# Patient Record
Sex: MALE | Race: WHITE | NOT HISPANIC OR LATINO | URBAN - METROPOLITAN AREA
[De-identification: names, ages, dates, MRNs, and addresses within clinical notes are randomized per-mention and may not be internally consistent; named-entity substitution may affect disease eponyms.]

---

## 2018-10-08 ENCOUNTER — EMERGENCY (EMERGENCY)
Facility: HOSPITAL | Age: 48
LOS: 1 days | Discharge: HOME | End: 2018-10-08
Attending: EMERGENCY MEDICINE
Payer: COMMERCIAL

## 2018-10-08 VITALS
RESPIRATION RATE: 18 BRPM | SYSTOLIC BLOOD PRESSURE: 166 MMHG | HEART RATE: 94 BPM | TEMPERATURE: 99 F | DIASTOLIC BLOOD PRESSURE: 60 MMHG | WEIGHT: 220.02 LBS | OXYGEN SATURATION: 96 % | HEIGHT: 71 IN

## 2018-10-08 PROCEDURE — 99284 EMERGENCY DEPT VISIT MOD MDM: CPT | Mod: 25

## 2018-10-08 RX ORDER — MORPHINE SULFATE 50 MG/1
4 CAPSULE, EXTENDED RELEASE ORAL ONCE
Qty: 0 | Refills: 0 | Status: DISCONTINUED | OUTPATIENT
Start: 2018-10-08 | End: 2018-10-08

## 2018-10-08 RX ORDER — ALBUTEROL 90 UG/1
2.5 AEROSOL, METERED ORAL ONCE
Qty: 0 | Refills: 0 | Status: COMPLETED | OUTPATIENT
Start: 2018-10-08 | End: 2018-10-08

## 2018-10-08 RX ADMIN — ALBUTEROL 2.5 MILLIGRAM(S): 90 AEROSOL, METERED ORAL at 23:54

## 2018-10-08 NOTE — ED PROVIDER NOTE - MEDICAL DECISION MAKING DETAILS
47 y/o M pt woth HTN and sarcoidosis presents with sudden onset of shortness of breath and chest pain. Will do EKG, labs and CXR. Given albuterol nebs and reassess.

## 2018-10-08 NOTE — ED PROVIDER NOTE - PROGRESS NOTE DETAILS
labs show neg trop, ddimer wnl, CXR shows no focal infiltrate buts shows increased interstitial markings labs show neg trop, ddimer wnl, CXR shows no focal infiltrate buts shows increased interstitial markings  on reeval pt requesting discharge but agreed to CTA, CTA neg for PE but shows 5cm mediastinal mass, discussed above results with patient, pt reports he has a pulmonologist and will f/u with him.   On reeval patient shows no evidence of resp distress/hypoxia. Pt stable for discharge.

## 2018-10-08 NOTE — ED PROVIDER NOTE - OBJECTIVE STATEMENT
47 y/o M pt with a PMHx of HTN, sarcoidosis presents to the ED with complaints of sudden onset shortness of breath and chest pain. Patient reports he was walking his dog when he suddenly felt shortness of breath and used his family member's inhaler with minimal relief. Patient states he has a pressure on the right side of his chest. Patient notes his sarcoidosis has not given him any issues for the last 13 years and perviously managed it with steroids. Patient reports last week he had a cold with cough but has then resolved. Patient notes for the last couple days he has left lower leg pain. Patient denies recent trauma, FHx or personal hx of blood clots. NKDA.

## 2018-10-08 NOTE — ED PROVIDER NOTE - CONDUCTED A DETAILED DISCUSSION WITH PATIENT AND/OR GUARDIAN REGARDING, MDM
lab results/radiology results return to ED if symptoms worsen, persist or questions arise/radiology results/need for outpatient follow-up/lab results

## 2018-10-09 VITALS
RESPIRATION RATE: 18 BRPM | DIASTOLIC BLOOD PRESSURE: 67 MMHG | SYSTOLIC BLOOD PRESSURE: 114 MMHG | TEMPERATURE: 98 F | OXYGEN SATURATION: 97 % | HEART RATE: 98 BPM

## 2018-10-09 LAB
ALBUMIN SERPL ELPH-MCNC: 3.8 G/DL — SIGNIFICANT CHANGE UP (ref 3.5–5)
ALP SERPL-CCNC: 92 U/L — SIGNIFICANT CHANGE UP (ref 40–120)
ALT FLD-CCNC: 40 U/L DA — SIGNIFICANT CHANGE UP (ref 10–60)
ANION GAP SERPL CALC-SCNC: 13 MMOL/L — SIGNIFICANT CHANGE UP (ref 5–17)
APTT BLD: 30.8 SEC — SIGNIFICANT CHANGE UP (ref 27.5–37.4)
AST SERPL-CCNC: 17 U/L — SIGNIFICANT CHANGE UP (ref 10–40)
BILIRUB SERPL-MCNC: 0.2 MG/DL — SIGNIFICANT CHANGE UP (ref 0.2–1.2)
BUN SERPL-MCNC: 21 MG/DL — HIGH (ref 7–18)
CALCIUM SERPL-MCNC: 9 MG/DL — SIGNIFICANT CHANGE UP (ref 8.4–10.5)
CHLORIDE SERPL-SCNC: 106 MMOL/L — SIGNIFICANT CHANGE UP (ref 96–108)
CO2 SERPL-SCNC: 23 MMOL/L — SIGNIFICANT CHANGE UP (ref 22–31)
CREAT SERPL-MCNC: 0.92 MG/DL — SIGNIFICANT CHANGE UP (ref 0.5–1.3)
D DIMER BLD IA.RAPID-MCNC: <150 NG/ML DDU — SIGNIFICANT CHANGE UP
GLUCOSE SERPL-MCNC: 127 MG/DL — HIGH (ref 70–99)
HCT VFR BLD CALC: 41.2 % — SIGNIFICANT CHANGE UP (ref 39–50)
HGB BLD-MCNC: 13.8 G/DL — SIGNIFICANT CHANGE UP (ref 13–17)
INR BLD: 1.08 RATIO — SIGNIFICANT CHANGE UP (ref 0.88–1.16)
MCHC RBC-ENTMCNC: 28.8 PG — SIGNIFICANT CHANGE UP (ref 27–34)
MCHC RBC-ENTMCNC: 33.5 GM/DL — SIGNIFICANT CHANGE UP (ref 32–36)
MCV RBC AUTO: 86 FL — SIGNIFICANT CHANGE UP (ref 80–100)
PLATELET # BLD AUTO: 191 K/UL — SIGNIFICANT CHANGE UP (ref 150–400)
POTASSIUM SERPL-MCNC: 4 MMOL/L — SIGNIFICANT CHANGE UP (ref 3.5–5.3)
POTASSIUM SERPL-SCNC: 4 MMOL/L — SIGNIFICANT CHANGE UP (ref 3.5–5.3)
PROT SERPL-MCNC: 6.7 G/DL — SIGNIFICANT CHANGE UP (ref 6–8.3)
PROTHROM AB SERPL-ACNC: 11.8 SEC — SIGNIFICANT CHANGE UP (ref 9.8–12.7)
RBC # BLD: 4.79 M/UL — SIGNIFICANT CHANGE UP (ref 4.2–5.8)
RBC # FLD: 12 % — SIGNIFICANT CHANGE UP (ref 10.3–14.5)
SODIUM SERPL-SCNC: 142 MMOL/L — SIGNIFICANT CHANGE UP (ref 135–145)
TROPONIN I SERPL-MCNC: <0.015 NG/ML — SIGNIFICANT CHANGE UP (ref 0–0.04)
WBC # BLD: 5.5 K/UL — SIGNIFICANT CHANGE UP (ref 3.8–10.5)
WBC # FLD AUTO: 5.5 K/UL — SIGNIFICANT CHANGE UP (ref 3.8–10.5)

## 2018-10-09 PROCEDURE — 83880 ASSAY OF NATRIURETIC PEPTIDE: CPT

## 2018-10-09 PROCEDURE — 71045 X-RAY EXAM CHEST 1 VIEW: CPT | Mod: 26

## 2018-10-09 PROCEDURE — 71275 CT ANGIOGRAPHY CHEST: CPT

## 2018-10-09 PROCEDURE — 85730 THROMBOPLASTIN TIME PARTIAL: CPT

## 2018-10-09 PROCEDURE — 71275 CT ANGIOGRAPHY CHEST: CPT | Mod: 26

## 2018-10-09 PROCEDURE — 84484 ASSAY OF TROPONIN QUANT: CPT

## 2018-10-09 PROCEDURE — 71045 X-RAY EXAM CHEST 1 VIEW: CPT

## 2018-10-09 PROCEDURE — 85379 FIBRIN DEGRADATION QUANT: CPT

## 2018-10-09 PROCEDURE — 94640 AIRWAY INHALATION TREATMENT: CPT

## 2018-10-09 PROCEDURE — 85027 COMPLETE CBC AUTOMATED: CPT

## 2018-10-09 PROCEDURE — 85610 PROTHROMBIN TIME: CPT

## 2018-10-09 PROCEDURE — 99284 EMERGENCY DEPT VISIT MOD MDM: CPT | Mod: 25

## 2018-10-09 PROCEDURE — 93005 ELECTROCARDIOGRAM TRACING: CPT

## 2018-10-09 PROCEDURE — 80053 COMPREHEN METABOLIC PANEL: CPT

## 2018-10-09 NOTE — ED ADULT NURSE NOTE - NSIMPLEMENTINTERV_GEN_ALL_ED
Implemented All Universal Safety Interventions:  [place] to call system. Call bell, personal items and telephone within reach. Instruct patient to call for assistance. Room bathroom lighting operational. Non-slip footwear when patient is off stretcher. Physically safe environment: no spills, clutter or unnecessary equipment. Stretcher in lowest position, wheels locked, appropriate side rails in place.

## 2021-06-29 ENCOUNTER — INPATIENT (INPATIENT)
Facility: HOSPITAL | Age: 51
LOS: 0 days | Discharge: LEFT BEFORE DISCHARGE | DRG: 343 | End: 2021-06-29
Attending: SURGERY | Admitting: SURGERY
Payer: COMMERCIAL

## 2021-06-29 VITALS
RESPIRATION RATE: 16 BRPM | SYSTOLIC BLOOD PRESSURE: 112 MMHG | DIASTOLIC BLOOD PRESSURE: 65 MMHG | HEART RATE: 67 BPM | OXYGEN SATURATION: 98 % | TEMPERATURE: 98 F

## 2021-06-29 VITALS
HEART RATE: 62 BPM | WEIGHT: 210.1 LBS | TEMPERATURE: 98 F | RESPIRATION RATE: 17 BRPM | HEIGHT: 71 IN | SYSTOLIC BLOOD PRESSURE: 157 MMHG | OXYGEN SATURATION: 99 % | DIASTOLIC BLOOD PRESSURE: 96 MMHG

## 2021-06-29 DIAGNOSIS — K35.80 UNSPECIFIED ACUTE APPENDICITIS: ICD-10-CM

## 2021-06-29 LAB
ALBUMIN SERPL ELPH-MCNC: 4.4 G/DL — SIGNIFICANT CHANGE UP (ref 3.5–5)
ALP SERPL-CCNC: 73 U/L — SIGNIFICANT CHANGE UP (ref 40–120)
ALT FLD-CCNC: 30 U/L DA — SIGNIFICANT CHANGE UP (ref 10–60)
ANION GAP SERPL CALC-SCNC: 11 MMOL/L — SIGNIFICANT CHANGE UP (ref 5–17)
APPEARANCE UR: CLEAR — SIGNIFICANT CHANGE UP
APTT BLD: 34.1 SEC — SIGNIFICANT CHANGE UP (ref 27.5–35.5)
AST SERPL-CCNC: 17 U/L — SIGNIFICANT CHANGE UP (ref 10–40)
BASOPHILS # BLD AUTO: 0.03 K/UL — SIGNIFICANT CHANGE UP (ref 0–0.2)
BASOPHILS NFR BLD AUTO: 0.2 % — SIGNIFICANT CHANGE UP (ref 0–2)
BILIRUB SERPL-MCNC: 0.5 MG/DL — SIGNIFICANT CHANGE UP (ref 0.2–1.2)
BILIRUB UR-MCNC: NEGATIVE — SIGNIFICANT CHANGE UP
BLD GP AB SCN SERPL QL: SIGNIFICANT CHANGE UP
BUN SERPL-MCNC: 16 MG/DL — SIGNIFICANT CHANGE UP (ref 7–18)
CALCIUM SERPL-MCNC: 9.3 MG/DL — SIGNIFICANT CHANGE UP (ref 8.4–10.5)
CHLORIDE SERPL-SCNC: 103 MMOL/L — SIGNIFICANT CHANGE UP (ref 96–108)
CO2 SERPL-SCNC: 23 MMOL/L — SIGNIFICANT CHANGE UP (ref 22–31)
COLOR SPEC: YELLOW — SIGNIFICANT CHANGE UP
CREAT SERPL-MCNC: 0.76 MG/DL — SIGNIFICANT CHANGE UP (ref 0.5–1.3)
DIFF PNL FLD: NEGATIVE — SIGNIFICANT CHANGE UP
EOSINOPHIL # BLD AUTO: 0 K/UL — SIGNIFICANT CHANGE UP (ref 0–0.5)
EOSINOPHIL NFR BLD AUTO: 0 % — SIGNIFICANT CHANGE UP (ref 0–6)
GLUCOSE SERPL-MCNC: 167 MG/DL — HIGH (ref 70–99)
GLUCOSE UR QL: NEGATIVE — SIGNIFICANT CHANGE UP
HCT VFR BLD CALC: 43.7 % — SIGNIFICANT CHANGE UP (ref 39–50)
HGB BLD-MCNC: 15.1 G/DL — SIGNIFICANT CHANGE UP (ref 13–17)
IMM GRANULOCYTES NFR BLD AUTO: 0.5 % — SIGNIFICANT CHANGE UP (ref 0–1.5)
INR BLD: 1.11 RATIO — SIGNIFICANT CHANGE UP (ref 0.88–1.16)
KETONES UR-MCNC: ABNORMAL
LACTATE SERPL-SCNC: 1.6 MMOL/L — SIGNIFICANT CHANGE UP (ref 0.7–2)
LEUKOCYTE ESTERASE UR-ACNC: NEGATIVE — SIGNIFICANT CHANGE UP
LIDOCAIN IGE QN: 130 U/L — SIGNIFICANT CHANGE UP (ref 73–393)
LYMPHOCYTES # BLD AUTO: 0.91 K/UL — LOW (ref 1–3.3)
LYMPHOCYTES # BLD AUTO: 5.8 % — LOW (ref 13–44)
MCHC RBC-ENTMCNC: 29 PG — SIGNIFICANT CHANGE UP (ref 27–34)
MCHC RBC-ENTMCNC: 34.6 GM/DL — SIGNIFICANT CHANGE UP (ref 32–36)
MCV RBC AUTO: 84 FL — SIGNIFICANT CHANGE UP (ref 80–100)
MONOCYTES # BLD AUTO: 0.37 K/UL — SIGNIFICANT CHANGE UP (ref 0–0.9)
MONOCYTES NFR BLD AUTO: 2.3 % — SIGNIFICANT CHANGE UP (ref 2–14)
NEUTROPHILS # BLD AUTO: 14.41 K/UL — HIGH (ref 1.8–7.4)
NEUTROPHILS NFR BLD AUTO: 91.2 % — HIGH (ref 43–77)
NITRITE UR-MCNC: NEGATIVE — SIGNIFICANT CHANGE UP
NRBC # BLD: 0 /100 WBCS — SIGNIFICANT CHANGE UP (ref 0–0)
PH UR: 7 — SIGNIFICANT CHANGE UP (ref 5–8)
PLATELET # BLD AUTO: 209 K/UL — SIGNIFICANT CHANGE UP (ref 150–400)
POTASSIUM SERPL-MCNC: 4.1 MMOL/L — SIGNIFICANT CHANGE UP (ref 3.5–5.3)
POTASSIUM SERPL-SCNC: 4.1 MMOL/L — SIGNIFICANT CHANGE UP (ref 3.5–5.3)
PROT SERPL-MCNC: 7.5 G/DL — SIGNIFICANT CHANGE UP (ref 6–8.3)
PROT UR-MCNC: NEGATIVE — SIGNIFICANT CHANGE UP
PROTHROM AB SERPL-ACNC: 13.1 SEC — SIGNIFICANT CHANGE UP (ref 10.6–13.6)
RBC # BLD: 5.2 M/UL — SIGNIFICANT CHANGE UP (ref 4.2–5.8)
RBC # FLD: 12.6 % — SIGNIFICANT CHANGE UP (ref 10.3–14.5)
SARS-COV-2 RNA SPEC QL NAA+PROBE: SIGNIFICANT CHANGE UP
SODIUM SERPL-SCNC: 137 MMOL/L — SIGNIFICANT CHANGE UP (ref 135–145)
SP GR SPEC: 1.01 — SIGNIFICANT CHANGE UP (ref 1.01–1.02)
TRIGL SERPL-MCNC: 59 MG/DL — SIGNIFICANT CHANGE UP
UROBILINOGEN FLD QL: NEGATIVE — SIGNIFICANT CHANGE UP
WBC # BLD: 15.8 K/UL — HIGH (ref 3.8–10.5)
WBC # FLD AUTO: 15.8 K/UL — HIGH (ref 3.8–10.5)

## 2021-06-29 PROCEDURE — 83690 ASSAY OF LIPASE: CPT

## 2021-06-29 PROCEDURE — 99285 EMERGENCY DEPT VISIT HI MDM: CPT

## 2021-06-29 PROCEDURE — 74177 CT ABD & PELVIS W/CONTRAST: CPT | Mod: ME

## 2021-06-29 PROCEDURE — G1004: CPT

## 2021-06-29 PROCEDURE — 88304 TISSUE EXAM BY PATHOLOGIST: CPT

## 2021-06-29 PROCEDURE — 84478 ASSAY OF TRIGLYCERIDES: CPT

## 2021-06-29 PROCEDURE — 44970 LAPAROSCOPY APPENDECTOMY: CPT

## 2021-06-29 PROCEDURE — 87635 SARS-COV-2 COVID-19 AMP PRB: CPT

## 2021-06-29 PROCEDURE — 85025 COMPLETE CBC W/AUTO DIFF WBC: CPT

## 2021-06-29 PROCEDURE — 96375 TX/PRO/DX INJ NEW DRUG ADDON: CPT

## 2021-06-29 PROCEDURE — 44970 LAPAROSCOPY APPENDECTOMY: CPT | Mod: AS

## 2021-06-29 PROCEDURE — 86900 BLOOD TYPING SEROLOGIC ABO: CPT

## 2021-06-29 PROCEDURE — 80053 COMPREHEN METABOLIC PANEL: CPT

## 2021-06-29 PROCEDURE — 74177 CT ABD & PELVIS W/CONTRAST: CPT | Mod: 26,ME

## 2021-06-29 PROCEDURE — 71045 X-RAY EXAM CHEST 1 VIEW: CPT

## 2021-06-29 PROCEDURE — 83605 ASSAY OF LACTIC ACID: CPT

## 2021-06-29 PROCEDURE — 85730 THROMBOPLASTIN TIME PARTIAL: CPT

## 2021-06-29 PROCEDURE — 85610 PROTHROMBIN TIME: CPT

## 2021-06-29 PROCEDURE — 96374 THER/PROPH/DIAG INJ IV PUSH: CPT

## 2021-06-29 PROCEDURE — 99222 1ST HOSP IP/OBS MODERATE 55: CPT | Mod: 57,25

## 2021-06-29 PROCEDURE — 81003 URINALYSIS AUTO W/O SCOPE: CPT

## 2021-06-29 PROCEDURE — 36415 COLL VENOUS BLD VENIPUNCTURE: CPT

## 2021-06-29 PROCEDURE — 71045 X-RAY EXAM CHEST 1 VIEW: CPT | Mod: 26

## 2021-06-29 PROCEDURE — 88304 TISSUE EXAM BY PATHOLOGIST: CPT | Mod: 26

## 2021-06-29 PROCEDURE — C1889: CPT

## 2021-06-29 PROCEDURE — 86901 BLOOD TYPING SEROLOGIC RH(D): CPT

## 2021-06-29 PROCEDURE — 86850 RBC ANTIBODY SCREEN: CPT

## 2021-06-29 RX ORDER — SODIUM CHLORIDE 9 MG/ML
2000 INJECTION INTRAMUSCULAR; INTRAVENOUS; SUBCUTANEOUS ONCE
Refills: 0 | Status: COMPLETED | OUTPATIENT
Start: 2021-06-29 | End: 2021-06-29

## 2021-06-29 RX ORDER — ACETAMINOPHEN WITH CODEINE 300MG-30MG
1 TABLET ORAL EVERY 4 HOURS
Refills: 0 | Status: DISCONTINUED | OUTPATIENT
Start: 2021-06-29 | End: 2021-06-29

## 2021-06-29 RX ORDER — MORPHINE SULFATE 50 MG/1
4 CAPSULE, EXTENDED RELEASE ORAL ONCE
Refills: 0 | Status: DISCONTINUED | OUTPATIENT
Start: 2021-06-29 | End: 2021-06-29

## 2021-06-29 RX ORDER — KETOROLAC TROMETHAMINE 30 MG/ML
15 SYRINGE (ML) INJECTION ONCE
Refills: 0 | Status: DISCONTINUED | OUTPATIENT
Start: 2021-06-29 | End: 2021-06-29

## 2021-06-29 RX ORDER — ACETAMINOPHEN 500 MG
650 TABLET ORAL EVERY 6 HOURS
Refills: 0 | Status: DISCONTINUED | OUTPATIENT
Start: 2021-06-29 | End: 2021-06-29

## 2021-06-29 RX ORDER — ONDANSETRON 8 MG/1
4 TABLET, FILM COATED ORAL ONCE
Refills: 0 | Status: COMPLETED | OUTPATIENT
Start: 2021-06-29 | End: 2021-06-29

## 2021-06-29 RX ORDER — CEFOTETAN DISODIUM 1 G
1 VIAL (EA) INJECTION ONCE
Refills: 0 | Status: DISCONTINUED | OUTPATIENT
Start: 2021-06-29 | End: 2021-06-29

## 2021-06-29 RX ORDER — METRONIDAZOLE 500 MG
TABLET ORAL
Refills: 0 | Status: DISCONTINUED | OUTPATIENT
Start: 2021-06-29 | End: 2021-06-29

## 2021-06-29 RX ORDER — KETOROLAC TROMETHAMINE 30 MG/ML
15 SYRINGE (ML) INJECTION EVERY 8 HOURS
Refills: 0 | Status: DISCONTINUED | OUTPATIENT
Start: 2021-06-29 | End: 2021-06-29

## 2021-06-29 RX ORDER — FENTANYL CITRATE 50 UG/ML
25 INJECTION INTRAVENOUS
Refills: 0 | Status: DISCONTINUED | OUTPATIENT
Start: 2021-06-29 | End: 2021-06-29

## 2021-06-29 RX ORDER — CEFOTETAN DISODIUM 1 G
1 VIAL (EA) INJECTION EVERY 12 HOURS
Refills: 0 | Status: DISCONTINUED | OUTPATIENT
Start: 2021-06-30 | End: 2021-06-29

## 2021-06-29 RX ORDER — METRONIDAZOLE 500 MG
500 TABLET ORAL ONCE
Refills: 0 | Status: DISCONTINUED | OUTPATIENT
Start: 2021-06-29 | End: 2021-06-29

## 2021-06-29 RX ORDER — ONDANSETRON 8 MG/1
4 TABLET, FILM COATED ORAL EVERY 6 HOURS
Refills: 0 | Status: DISCONTINUED | OUTPATIENT
Start: 2021-06-29 | End: 2021-06-29

## 2021-06-29 RX ORDER — SODIUM CHLORIDE 9 MG/ML
1000 INJECTION, SOLUTION INTRAVENOUS
Refills: 0 | Status: DISCONTINUED | OUTPATIENT
Start: 2021-06-29 | End: 2021-06-29

## 2021-06-29 RX ORDER — FENTANYL CITRATE 50 UG/ML
50 INJECTION INTRAVENOUS
Refills: 0 | Status: DISCONTINUED | OUTPATIENT
Start: 2021-06-29 | End: 2021-06-29

## 2021-06-29 RX ORDER — KETOROLAC TROMETHAMINE 30 MG/ML
30 SYRINGE (ML) INJECTION ONCE
Refills: 0 | Status: DISCONTINUED | OUTPATIENT
Start: 2021-06-29 | End: 2021-06-29

## 2021-06-29 RX ORDER — CEFOTETAN DISODIUM 1 G
VIAL (EA) INJECTION
Refills: 0 | Status: DISCONTINUED | OUTPATIENT
Start: 2021-06-29 | End: 2021-06-29

## 2021-06-29 RX ORDER — HEPARIN SODIUM 5000 [USP'U]/ML
5000 INJECTION INTRAVENOUS; SUBCUTANEOUS EVERY 8 HOURS
Refills: 0 | Status: DISCONTINUED | OUTPATIENT
Start: 2021-06-29 | End: 2021-06-29

## 2021-06-29 RX ORDER — ONDANSETRON 8 MG/1
4 TABLET, FILM COATED ORAL ONCE
Refills: 0 | Status: DISCONTINUED | OUTPATIENT
Start: 2021-06-29 | End: 2021-06-29

## 2021-06-29 RX ORDER — METRONIDAZOLE 500 MG
500 TABLET ORAL EVERY 8 HOURS
Refills: 0 | Status: DISCONTINUED | OUTPATIENT
Start: 2021-06-29 | End: 2021-06-29

## 2021-06-29 RX ADMIN — Medication 30 MILLIGRAM(S): at 13:30

## 2021-06-29 RX ADMIN — Medication 30 MILLIGRAM(S): at 13:04

## 2021-06-29 RX ADMIN — MORPHINE SULFATE 4 MILLIGRAM(S): 50 CAPSULE, EXTENDED RELEASE ORAL at 13:00

## 2021-06-29 RX ADMIN — MORPHINE SULFATE 4 MILLIGRAM(S): 50 CAPSULE, EXTENDED RELEASE ORAL at 12:41

## 2021-06-29 RX ADMIN — ONDANSETRON 4 MILLIGRAM(S): 8 TABLET, FILM COATED ORAL at 13:04

## 2021-06-29 RX ADMIN — MORPHINE SULFATE 4 MILLIGRAM(S): 50 CAPSULE, EXTENDED RELEASE ORAL at 16:41

## 2021-06-29 RX ADMIN — SODIUM CHLORIDE 2000 MILLILITER(S): 9 INJECTION INTRAMUSCULAR; INTRAVENOUS; SUBCUTANEOUS at 12:41

## 2021-06-29 NOTE — H&P ADULT - HISTORY OF PRESENT ILLNESS
49 y/o male with pmhx of sarcoidosis, pshx of right inguinal hernia repair, presents to the ED with periumbilical pain now localized in the RLQ, nausea and vomiting NBNB that began at 3 AM. Patient states that the pain is intermittent and severe without any alleviating factors. Patient denies any chest pain, shortness of breath, fever/chills, changes in bowel function or difficulty urinating

## 2021-06-29 NOTE — ED PROVIDER NOTE - NS ED MD TWO NIGHTS YN
Assess on lifestyle only   No sig FHx  
Much improved when last checked  Recheck w/ labs.   No B sx or LAD on exam  Routine ca screening advised.   
Vaccines:  Flu shot today, return for a Tdap  Labs:  Fasting labs in the future w/ Tdap - when not shoveling   Pap:  Due 10/2022  Mammogram:  ordered  Colonoscopy:  ordered  DEXA:  At menopause   Daily aerobic exercise advised.  Three dairy equivalents daily recommended for bone health, if not possible, use calcium supplements to take in a total of 1200 mg calcium daily  Aspirin is not indicated      
When rechecked last time were normal, but if I recheck now, likely to be elevated from heavy use of muscles and muscle tissue damage  Recheck labs in a couple of weeks.   
Yes

## 2021-06-29 NOTE — ED ADULT NURSE NOTE - OBJECTIVE STATEMENT
presents to the ER c/o severe abdominal pain w/ nausea reports started at 3am and gotten worse   vomited large amount and states felt better immediately   seen and examined by Md Schultz pt for Ct Abd w/ IV contrast

## 2021-06-29 NOTE — H&P ADULT - ASSESSMENT
51 y/o male with acute appendicitis without perf/abscess  -Admit to surgery under Dr. Briones  -NPO  -IVF  -Preop labs  -IV Abx  -COVID swab active  -Pain control PRN  -DVT ppx   -Discussed with Dr. Briones who agrees

## 2021-06-29 NOTE — H&P ADULT - NSHPLABSRESULTS_GEN_ALL_CORE
CBC Full  -  ( 29 Jun 2021 12:35 )  WBC Count : 15.80 K/uL  RBC Count : 5.20 M/uL  Hemoglobin : 15.1 g/dL  Hematocrit : 43.7 %  Platelet Count - Automated : 209 K/uL  Mean Cell Volume : 84.0 fl  Mean Cell Hemoglobin : 29.0 pg  Mean Cell Hemoglobin Concentration : 34.6 gm/dL  Auto Neutrophil # : 14.41 K/uL  Auto Lymphocyte # : 0.91 K/uL  Auto Monocyte # : 0.37 K/uL  Auto Eosinophil # : 0.00 K/uL  Auto Basophil # : 0.03 K/uL  Auto Neutrophil % : 91.2 %  Auto Lymphocyte % : 5.8 %  Auto Monocyte % : 2.3 %  Auto Eosinophil % : 0.0 %  Auto Basophil % : 0.2 %  06-29    137  |  103  |  16  ----------------------------<  167<H>  4.1   |  23  |  0.76    Ca    9.3      29 Jun 2021 12:35    TPro  7.5  /  Alb  4.4  /  TBili  0.5  /  DBili  x   /  AST  17  /  ALT  30  /  AlkPhos  73  06-29    < from: CT Abdomen and Pelvis w/ IV Cont (06.29.21 @ 14:19) >    EXAM:  CT ABDOMEN AND PELVIS IC                            PROCEDURE DATE:  06/29/2021          INTERPRETATION:  CLINICAL INFORMATION: Abdominal pain.    COMPARISON: Chest CT from 10/9/2018    CONTRAST/COMPLICATIONS:  IV Contrast: Omnipaque 350  90cc administered   10 cc discarded  Oral Contrast: NONE  Complications: None reported at time of study completion    PROCEDURE:  CT of the Abdomen and Pelvis was performed.  Sagittal and coronal reformats were performed.    FINDINGS:  LOWER CHEST: Mild bibasilar dependent atelectasis.    LIVER: Within normal limits.  BILE DUCTS: Normal caliber.  GALLBLADDER: Within normal limits.  SPLEEN: Nonspecific indeterminate hypodense lesion in the medial spleen measuring up to 3.7 cm, present in 2018 but less conspicuous on that exam.  PANCREAS: Within normal limits.  ADRENALS: Within normal limits.  KIDNEYS/URETERS: Left renal cyst. Subcentimeter right renal hypodensities too small to characterize. No hydronephrosis. Homogeneous symmetric renal enhancement.    BLADDER: Within normal limits.  REPRODUCTIVE ORGANS: Prostate normal in size.    BOWEL: No bowel obstruction. Mild colonic diverticulosis. Mildly dilated appendix up to 9 mm containing an appendicolith in the proximal portion, with surrounding mild inflammatory stranding (example 2, 91).  PERITONEUM: No ascites. No free air or abscess.  VESSELS: Trace atherosclerotic changes.  RETROPERITONEUM/LYMPH NODES: No lymphadenopathy.  ABDOMINAL WALL: Tiny fat-containing umbilical hernia. Suspectedright inguinal hernia plug.  BONES: Degenerative changes. Subcentimeter sclerotic foci, nonspecific but most likely bone islands. L3 vertebral body hemangioma.    IMPRESSION:  Acute appendicitis. No free air or abscess.    Nonspecific 3.7 cm lesion in the spleen, present since at least 2018. Nonurgent MRI could be considered for further evaluation.              ОЛЬГА SPARKS MD; Attending Radiologist  This document has been electronically signed. Jun 29 2021  2:34PM    < end of copied text >

## 2021-06-29 NOTE — ED PROVIDER NOTE - CLINICAL SUMMARY MEDICAL DECISION MAKING FREE TEXT BOX
as long as he or she has diarrhea. Do not use these drinks as the only source of liquids or food for more than 12 to 24 hours. · Do not give your child over-the-counter antidiarrhea or upset-stomach medicines without talking to your doctor first. Marco Gloss not give bismuth (Pepto-Bismol) or other medicines that contain salicylates, a form of aspirin, or aspirin. Aspirin has been linked to Reye syndrome, a serious illness. · Wash your hands after you change diapers and before you touch food. Have your child wash his or her hands after using the toilet and before eating. · Make sure that your child rests. Keep your child at home as long as he or she has a fever. · If your child is younger than age 3 or weighs less than 24 pounds, follow your doctor's advice about the amount of medicine to give your child. When should you call for help? Call 911 anytime you think your child may need emergency care. For example, call if:  ? · Your child passes out (loses consciousness). ? · Your child is confused, does not know where he or she is, or is extremely sleepy or hard to wake up. ? · Your child passes maroon or very bloody stools. ?Call your doctor now or seek immediate medical care if:  ? · Your child has signs of needing more fluids. These signs include sunken eyes with few tears, a dry mouth with little or no spit, and little or no urine for 8 or more hours. ? · Your child has new or worse belly pain. ? · Your child's stools are black and look like tar, or they have streaks of blood. ? · Your child has a new or higher fever. ? · Your child has severe diarrhea. (This means large, loose bowel movements every 1 to 2 hours.)   ? Watch closely for changes in your child's health, and be sure to contact your doctor if:  ? · Your child's diarrhea is getting worse. ? · Your child is not getting better after 2 days (48 hours). ? · You have questions or are worried about your child's illness.    Where can you learn
49 y/o male with abdominal pain, will get labs, hydration, pain medication and CT scan.

## 2021-06-29 NOTE — ED PROVIDER NOTE - OBJECTIVE STATEMENT
51 y/o male with no significant PMHx presents to ED c/o abdominal pain. Patient states sudden onset abdominal pain starting in epigastric and now periumbilical. Patient states pain comes in strong waves with associated vomiting. Patient denies history gallstones, ETOH use and fever.

## 2021-06-29 NOTE — ED ADULT NURSE NOTE - NSIMPLEMENTINTERV_GEN_ALL_ED
Implemented All Universal Safety Interventions:  [place] to call system. Call bell, personal items and telephone within reach. Instruct patient to call for assistance. Room bathroom lighting operational. Non-slip footwear when patient is off stretcher. Physically safe environment: no spills, clutter or unnecessary equipment. Stretcher in lowest position, wheels locked, appropriate side rails in place.

## 2021-06-30 NOTE — PROGRESS NOTE ADULT - SUBJECTIVE AND OBJECTIVE BOX
POST-OPERATIVE NOTE    Subjective:   50y Male s/p lap appendectomy POD #0 Denies nausea, vomiting, chest pain, sob, fevers chills. Pain is well controlled. Ambulating independently. Voiding spontaneously. However, pt wants to be discharged against medical advise as he is traveling tomorrow AM for his son's important function.     Vital Signs Last 24 Hrs  T(C): 36.7 (29 Jun 2021 20:20), Max: 36.9 (29 Jun 2021 18:13)  T(F): 98 (29 Jun 2021 20:20), Max: 98.4 (29 Jun 2021 18:13)  HR: 67 (29 Jun 2021 20:20) (62 - 88)  BP: 112/65 (29 Jun 2021 20:20) (112/65 - 163/82)  BP(mean): 87 (29 Jun 2021 19:35) (82 - 94)  RR: 16 (29 Jun 2021 20:20) (12 - 17)  SpO2: 98% (29 Jun 2021 20:20) (96% - 100%)  I&O's Detail    29 Jun 2021 07:01  -  30 Jun 2021 04:48  --------------------------------------------------------  IN:    Lactated Ringers Bolus: 1000 mL  Total IN: 1000 mL    OUT:    Voided (mL): 1200 mL  Total OUT: 1200 mL    Total NET: -200 mL          Physical Exam:  General: NAD, resting comfortably in bed  Pulmonary: Nonlabored breathing, no respiratory distress  Cardiovascular: NSR, S1, S2  Abdominal: soft, NT/ND, dressing c/d/i  Extremities: no edema, no calf tenderness, distal pulses are palpable     LABS:                        15.1   15.80 )-----------( 209      ( 29 Jun 2021 12:35 )             43.7     06-29    137  |  103  |  16  ----------------------------<  167<H>  4.1   |  23  |  0.76    Ca    9.3      29 Jun 2021 12:35    TPro  7.5  /  Alb  4.4  /  TBili  0.5  /  DBili  x   /  AST  17  /  ALT  30  /  AlkPhos  73  06-29    LIVER FUNCTIONS - ( 29 Jun 2021 12:35 )  Alb: 4.4 g/dL / Pro: 7.5 g/dL / ALK PHOS: 73 U/L / ALT: 30 U/L DA / AST: 17 U/L / GGT: x             MEDICATIONS  (STANDING):  cefoTEtan  IVPB 1 Gram(s) IV Intermittent once  cefoTEtan  IVPB 1 Gram(s) IV Intermittent every 12 hours  cefoTEtan  IVPB      heparin   Injectable 5000 Unit(s) SubCutaneous every 8 hours  lactated ringers. 1000 milliLiter(s) (135 mL/Hr) IV Continuous <Continuous>  metroNIDAZOLE  IVPB      metroNIDAZOLE  IVPB 500 milliGRAM(s) IV Intermittent once  metroNIDAZOLE  IVPB 500 milliGRAM(s) IV Intermittent every 8 hours    MEDICATIONS  (PRN):  acetaminophen   Tablet .. 650 milliGRAM(s) Oral every 6 hours PRN Temp greater or equal to 38C (100.4F), Mild Pain (1 - 3)  acetaminophen 300 mG/codeine 30 mG 1 Tablet(s) Oral every 4 hours PRN Moderate Pain (4 - 6)  ketorolac   Injectable 15 milliGRAM(s) IV Push every 8 hours PRN Moderate Pain (4 - 6)  ondansetron Injectable 4 milliGRAM(s) IV Push every 6 hours PRN Nausea      Assessment:  The patient is a 50y Male who is s/p lap appendectomy POD #0. Stable . But wants to leave AMA    Plan:  - Pain control as needed  -Dressing change prn   - OOB and ambulating as tolerated  -AMA forms signed. All risks and benefits explained  - DVT ppx

## 2021-07-02 LAB — SURGICAL PATHOLOGY STUDY: SIGNIFICANT CHANGE UP

## 2022-03-11 NOTE — ED ADULT NURSE NOTE - NS ED NURSE REPORT GIVEN DT
Discharge Summary - Adilene Trejo 34 y o  female MRN: [id number]    Unit/Bed#: -40 Encounter: [id number]    Admission Date: 3/8/2022     Discharge Date: 3/11/2022    Admitting Attending: Anabell Tobin  Delivering Attending: Anabell Tobin  Discharge Attending: Anabell Tobin    Diagnosis:  Pregnancy at 39w5d  Breech presentation  Failed external cephalic version  Fetal bradycardia    Procedures: Primary low transverse  section     Complications: none apparent     Adilene Trejo is a 34 y o  Celester Churchman with an MELODIE of Estimated Date of Delivery: 3/10/22 at 39w5d who was sent from the office for audible deceleration in the office  Upon arrival she was noted to have a spontaneous deceleration to the 100s which did recover  Decision made to proceed with delivery  She was noted to be breech presentation      After thorough counseling she elected to proceed with external cephalic version in hopes with proceeding with induction of labor once vertex presentation  Penicillin was given for GBS prophylaxis  She underwent an uncomplicated  delivery  She delivered a viable male  at 2102 on 3/8/22  Weight was 6lbs 14 1oz with APGARs of 8 and 9 at 1 and 5 minutes  Her preoperative Hb was 11 7  Her postoperative Hb was 10 3  Her postoperative course was uncomplicated  Condition at discharge: good     On day of discharge pain was well controlled, patient was tolerating PO, passing flatus, and had a bowel movement  She was discharged with standard post partum/ post operative instructions to follow up with her physician in 1 week for an incision check and in 3-6 weeks for a postpartum appointment  Discharge instructions/Information to patient and family:   -Do not place anything (no partner, tampons or douche) in your vagina for 6 weeks    -You may walk for exercise for the first 6 weeks then gradually return to your usual activities    -Please do not drive for 1 week if you have no stitches and for 2 weeks if you have stitches or underwent a  delivery     -You may take baths or shower per your preference    -Please look at your bust (breasts) in the mirror daily and call for redness or tenderness or increased warmth    -Please call us for temperature > 100 4*F or 38* C, worsening pain or a foul discharge  Discharge Medications:   Prenatal vitamin daily for 6 months or the duration of nursing whichever is longer  Motrin 600 mg orally every 6 hours as needed for pain  Tylenol (over the counter) per bottle directions as needed for pain: do NOT use with percocet  Hydrocortisone cream 1% (over the counter) applied 1-2x daily to hemorrhoids as needed  Percocet as needed    Provisions for Follow-Up Care: Follow up with your doctor in 1 week for incision site check       Planned Readmission: no    Jeanna Wood MD   OB/Gyn PGY-1   5:19 PM  22 29-Jun-2021 17:10

## 2023-03-09 NOTE — ED PROVIDER NOTE - RATE
-- DO NOT REPLY / DO NOT REPLY ALL --  -- Message is from [facility] [employer] ([facility]) --    General Patient Message: Patient has an appointment tomorrow, patient has been living with patient's son and he has placed her on a walker, her health has declined, patient's daughter Nasir is calling and feels like her dementia is worsening. She is not using her hands as much as she used too. Patient was previously living with Nasir until Nasir had to have surgery, then the patient went to stay with her son temporarily and he has not brought her back. All of these things have changed since she has been there with him vs being with her daughter. Nasir patient's daughter can be reached at [phone] but the patient's son is bringing her in for her appointment tomorrow and she is requesting a possible order for physical therapy.     Caller Information       Type Contact Phone/Fax    03/08/2023 01:05 PM CST Phone (Incoming) NASIR SOSA (Emergency Contact) [phone]        Alternative phone number: N/A    Can a detailed message be left? Yes    Message Turnaround:     Is it Working Hours? Yes - Working Hours     [place]:    Please give this turnaround time to the caller:   \"This message will be sent to [state Provider's name]. The clinical team will fulfill your request as soon as they review your message.\"                
NIYAH:    Spoke with Patient's daughter Delphine  Note: Delphine states that Anahi 's dementia and physical abilities to ambulate independently have worsened greatly since May 2022 when she moved in with her son (Deplhine's brother). Delphine states she asked her brother to take care of Anahi temporarily until after Delphine's daughter's surgery and she states he refuses to return Anahi back to Delphine.     Delphine states that Anahi now uses a walker to ambulate. She also states that when she lived with Delphine she used to walk her throughout the house, up and down stairs, and gave her clothes and things to fold to keep her active and to keep her strength up.    Delphine states her brother does not do anything to keep Anahi active. He will be bringing Anahi to her appointment tomorrow (March 9, 2023 at 0800) and is requesting that she be assessed to see if she needs to be evaluated for physical therapy. Delphine states she will try to attend her mothers appointment with her brother tomorrow at 0800.    All other questions answered.    
Noted, refer to progress note from today  
88